# Patient Record
(demographics unavailable — no encounter records)

---

## 2024-10-25 NOTE — DISCUSSION/SUMMARY
[de-identified] : Impression advanced osteoarthritis right hip Recommendation I have explained to patient and accompanying sister still has acceptable quality of life and as such not withstanding radiographic appearance of advanced osteoarthritis of the right hip she still functioning at a high level if symptoms severity increases in quality life deteriorates beyond her ability to cope and/or adapt patient understands that she is a candidate for total hip replacement.  Follow-up 6 months or sooner

## 2024-10-25 NOTE — PHYSICAL EXAM
[de-identified] : Constitutional:Well nourished , well developed and in no acute distress Psychiatric: Alert and oriented to time place and person.Appropriate affect  Skin:Head, neck, arms and lower extremities:no lesions or discoloration HEENT: Normocephalic, EOM intact, Nasal septum midline, Respiratory: Unlabored respirations,no audible wheezing ,no tachypnea, no cyanosis Cardiovascular: no leg swelling  no ankle edema no JVD, pulse regular Vascular: no calf or thigh tenderness,  Peripheral pulses; intact Lymphatics:No groin adenopathy,no lymphedema lower  or upper extremities Right right hip mild Trendelenburg leg length equal mild restricted internal rotation provocation of mild hip pain neurovascular intact right leg [de-identified] :  x-rays AP pelvis right hip superolateral joint space narrowing bone-on-bone subchondral sclerosis marginal osteophytes

## 2024-10-25 NOTE — HISTORY OF PRESENT ILLNESS
[de-identified] : Patient seen in follow-up for right hip.  Patient reports that if she "overdoes it particular going up and down a lot of stairs experiences right groin pain which is relieved by application of heat overall patient reports that she is "not too much pain" occasionally experiences point discomfort in the buttock and is noted by sister to limp when she walks.  Overall quality of life is not restricted in day-to-day activity patient infrequently takes ibuprofen with good good relief

## 2025-03-10 NOTE — HISTORY OF PRESENT ILLNESS
[de-identified] : Patient seen in follow-up for right hip.Patient has been undergoing physical therapy with minimal symptom improvement pain now interferes with sleep does affect daily quality of life.

## 2025-03-10 NOTE — PHYSICAL EXAM
[de-identified] : Constitutional:Well nourished , well developed and in no acute distress Psychiatric: Alert and oriented to time place and person.Appropriate affect  Skin:Head, neck, arms and lower extremities:no lesions or discoloration HEENT: Normocephalic, EOM intact, Nasal septum midline, Respiratory: Unlabored respirations,no audible wheezing ,no tachypnea, no cyanosis Cardiovascular: no leg swelling  no ankle edema no JVD, pulse regular Vascular: no calf or thigh tenderness,  Peripheral pulses; intact Lymphatics:No groin adenopathy,no lymphedema lower  or upper extremities Right right hip mild Trendelenburg leg length equal mild restricted internal rotation provocation of mild hip pain neurovascular intact right leg [de-identified] :  x-rays AP pelvis right hip superolateral joint space narrowing bone-on-bone subchondral sclerosis marginal osteophytes

## 2025-03-10 NOTE — DISCUSSION/SUMMARY
[de-identified] : Impression advanced osteoarthritis right hip Recommendation I have explained to The patient and if and when she feels quality life is sufficiently compromised she is a candidate for total hip replacement I reviewed risk benefits and alternatives

## 2025-03-26 NOTE — DISCUSSION/SUMMARY
[de-identified] : Impression advanced osteoarthritis right hip. 3-4 mm abrasion junction middle and lateral thigh. Instructed on local care. Will evaluate pre-op.  75 year old with right hip osteoarthritis, with debilitating right hip pain that is unresponsive to comprehensive conservative management and compromising quality of life. The conditions and treatment options have been discussed with the patient. Given Xrays, failure of prior conservative treatment including physical therapy, NSAIDs, cortisone injections, the patient understands that I recommend total hip replacement as their best option for long term pain relief. At this time, the patient is a candidate for surgery based on xrays and quality of life. The patient participated in an interactive discussion of the THR implant planned for their surgery with questions answered, agreed with the treatment plan, and has decided to move forward with elective THR as planned. Implant brand choices and bearing surfaces were offered to the patient. The patient agreed to the plan of care as well as the use of implants for their total hip replacement.   Patient will be scheduled for right total hip replacement.   Patient requires radiation treatment of the hip joint prior to the surgery to prevent heterotopic ossification.   I discussed with patient that if and when patient feels quality of life is significant compromised and unresponsive to conservative management patient will be considered a candidate for total hip replacement.   Dr. Amado Guerin MD

## 2025-03-26 NOTE — PHYSICAL EXAM
[de-identified] : Constitutional:Well nourished , well developed and in no acute distress Psychiatric: Alert and oriented to time place and person.Appropriate affect  Skin:Head, neck, arms and lower extremities:no lesions or discoloration HEENT: Normocephalic, EOM intact, Nasal septum midline, Respiratory: Unlabored respirations,no audible wheezing ,no tachypnea, no cyanosis Cardiovascular: no leg swelling  no ankle edema no JVD, pulse regular Vascular: no calf or thigh tenderness,  Peripheral pulses; intact Lymphatics:No groin adenopathy,no lymphedema lower  or upper extremities Right hip mild Trendelenburg leg length equal mild restricted internal rotation provocation of mild hip pain neurovascular intact right leg 3-4 mm abrasion junction middle and lateral thigh. [de-identified] :  x-rays AP pelvis right hip superolateral joint space narrowing bone-on-bone subchondral sclerosis marginal osteophytes

## 2025-03-26 NOTE — ADDENDUM
[FreeTextEntry1] : I, Zafar Nichols, acted solely as a scribe for Dr. Amado Guerin MD on this date 03/26/2025.   All medical record entries made by the Scribe were at my, Dr. Amado Guerin MD, direction and personally dictated by me on 03/26/2025. I have reviewed the chart and agree that the record accurately reflects my personal performance of the history, physical exam, assessment and plan. I have also personally directed, reviewed, and agreed with the chart.

## 2025-03-26 NOTE — HISTORY OF PRESENT ILLNESS
[de-identified] : Patient seen in follow-up for right hip pain. She reports no change in her symptoms since last visit. She is scheduled for a THR. She presents today for a skin check. Of note, she also details right knee pain.

## 2025-04-16 NOTE — ADDENDUM
[FreeTextEntry1] : This note was written by Dayanara Weaver on 04/16/2025 acting solely as a scribe for Dr. Amado Guerin.    All medical record entries made by the Scribe were at my, Dr. Amado Guerin, direction and personally dictated by me on 04/16/2025. I have personally reviewed the chart and agree that the record accurately reflects my personal performance of the history, physical exam, assessment and plan.

## 2025-04-16 NOTE — HISTORY OF PRESENT ILLNESS
[de-identified] : s/p right total hip replacement 04/03/2025 [de-identified] : ASHLEIGH RALPH is a 75 year female presents for evaluation s/p right THR. Significant improvement in preoperative pain. Denies fever, chills, groin pain, thigh pain. The patient does describe some residual numbness at operative site. Slight soreness near incision site when ambulating. Ambulating with a cane at this time. [de-identified] : Constitutional: Well nourished, no distress. No neurovascular symptoms.   o Right Hip Exam: Incision healed, clean dry, and in tact. Sutures in tact. Satisfactory gait. Leg lengths equal.  ROM: satisfactory pain free. Ligaments intact. resisted hip flexion 5/5, resisted hip abduction 5/5. [de-identified] : X-rays of the AP pelvis and AP, lateral of the right hip obtained today 04/16/2025 demonstrates total hip replacement components in good alignment, femoral heads well centered in acetabulum. [de-identified] : s/p right total hip replacement 04/03/2025 [de-identified] : 75-year-old female presents for evaluation s/p right THR.  Dressing applied. Wound care instructions provided today. They have been instructed on exercises to avoid, including running, jumping, squatting, lunging. PT.  US Doppler Rx given  Follow up 1 month

## 2025-04-16 NOTE — HISTORY OF PRESENT ILLNESS
[de-identified] : s/p right total hip replacement 04/03/2025 [de-identified] : ASHLEIGH RALPH is a 75 year female presents for evaluation s/p right THR. Significant improvement in preoperative pain. Denies fever, chills, groin pain, thigh pain. The patient does describe some residual numbness at operative site. Slight soreness near incision site when ambulating. Ambulating with a cane at this time. [de-identified] : Constitutional: Well nourished, no distress. No neurovascular symptoms.   o Right Hip Exam: Incision healed, clean dry, and in tact. Sutures in tact. Satisfactory gait. Leg lengths equal.  ROM: satisfactory pain free. Ligaments intact. resisted hip flexion 5/5, resisted hip abduction 5/5. [de-identified] : X-rays of the AP pelvis and AP, lateral of the right hip obtained today 04/16/2025 demonstrates total hip replacement components in good alignment, femoral heads well centered in acetabulum. [de-identified] : s/p right total hip replacement 04/03/2025 [de-identified] : 75-year-old female presents for evaluation s/p right THR.  Dressing applied. Wound care instructions provided today. They have been instructed on exercises to avoid, including running, jumping, squatting, lunging. PT.  US Doppler Rx given  Follow up 1 month

## 2025-05-14 NOTE — HISTORY OF PRESENT ILLNESS
[de-identified] : s/p right total hip replacement 04/03/2025 [de-identified] : ASHLEIGH RALPH is a 75 year female presents for evaluation s/p right THR. Significant improvement in preoperative pain. Preoperative weakness improved but still present. Difficulty with stair climbing. Denies fever, chills, groin pain, thigh pain. She did notice that a stitch fell off. Of note, the patient details bilateral knee pain, She is currently being treated by Dr. Evelyn Navarrete. She has received cortisone and Monovisc injections with good relief. Ambulating independently at this time. [de-identified] : Constitutional: Well nourished, no distress. No neurovascular symptoms.   o Right Hip Exam: Incision healed, clean dry, and in tact. Sutures removed previously. Satisfactory gait. Leg lengths equal.  ROM: satisfactory pain free. Ligaments intact. resisted hip flexion 5/5, resisted hip abduction 5/5. [de-identified] : X-rays of the AP pelvis and AP, lateral of the right hip obtained today 05/14/2025 demonstrates total hip replacement components in good alignment, femoral heads well centered in acetabulum.  Doppler Ultrasound obtained 04/17/2025 were negative for right lower extremity DVT. [de-identified] : s/p right total hip replacement 04/03/2025 [de-identified] : 75-year-old female presents for evaluation s/p right THR.  They have been instructed on exercises to avoid, including running, jumping, squatting, lunging. Continue PT. Follow-up with Dr. Evelyn Navarrete for her right knee pain.  Follow up 1 month

## 2025-05-14 NOTE — ADDENDUM
[FreeTextEntry1] : I, Zafar Nichols, acted solely as a scribe for Dr. Amado Guerin MD on this date 05/14/2025.   All medical record entries made by the Scribe were at my, Dr. Amado Guerin MD, direction and personally dictated by me on 05/14/2025. I have reviewed the chart and agree that the record accurately reflects my personal performance of the history, physical exam, assessment and plan. I have also personally directed, reviewed, and agreed with the chart.

## 2025-06-18 NOTE — ADDENDUM
[FreeTextEntry1] : This note was written by Dayanara Weaver on 06/18/2025 acting solely as a scribe for Dr. Amado Guerin.    All medical record entries made by the Scribe were at my, Dr. Amado Guerin, direction and personally dictated by me on 06/18/2025. I have personally reviewed the chart and agree that the record accurately reflects my personal performance of the history, physical exam, assessment and plan.

## 2025-06-18 NOTE — HISTORY OF PRESENT ILLNESS
[de-identified] : s/p right total hip replacement 04/03/2025 [de-identified] : ASHLEIGH RALPH is a 75-year-old female presents for evaluation s/p right THR. Significant improvement in preoperative pain. She is attending PT 2x a week making improvements. Patient continues to report right leg weakness. Ambulating independently at this time but she brings her cane out just in case.  [de-identified] : Constitutional: Well nourished, no distress. No neurovascular symptoms.   o Right Hip Exam: Incision healed, clean dry, and in tact. Trendelenburg gait. Leg lengths equal.  ROM: satisfactory pain free. Ligaments intact. resisted hip flexion 5/5, resisted hip abduction 5/5. [de-identified] : s/p right total hip replacement 04/03/2025 [de-identified] : 75-year-old female presents for evaluation s/p right THR.  They have been instructed on exercises to avoid, including running, jumping, squatting, lunging. Continue PT.  Follow up 1 month

## 2025-07-16 NOTE — PHYSICAL EXAM
[de-identified] : Constitutional: Well nourished, no distress. No neurovascular symptoms.  o Right Hip Exam: Incision healed, clean dry, and in tact. ambulating independently. Leg lengths equal. ROM:  flexion 90 internal 20, external 30, 30 abduction 35 adduction pain free. Ligaments intact. resisted hip flexion 5/5, resisted hip abduction 5/5. Denies groin or thigh pain.     n/a

## 2025-07-16 NOTE — DISCUSSION/SUMMARY
[de-identified] : 75-year-old female presents for evaluation s/p right THR. Discussed continue PT/HEP for stretching.   Follow-up 2 months

## 2025-07-16 NOTE — REASON FOR VISIT
[Follow-Up Visit] : a follow-up visit for [FreeTextEntry2] : s/p right total hip replacement 04/03/2025.

## 2025-07-16 NOTE — HISTORY OF PRESENT ILLNESS
[de-identified] : ASHLEIGH RALPH is a 75-year-old female presents for evaluation of right THR. Significant improvement in preoperative pain. She is attending PT 2x a week making improvements. Patient continues to report right leg weakness. Ambulating independently at this time but she brings her cane out just in case. denies groin or thigh pain, has experienced incisional site discomfort when bending down.

## 2025-07-16 NOTE — ADDENDUM
[FreeTextEntry1] : This note was written by Dayanara Weaver on 07/16/2025 acting solely as a scribe for Dr. Amado Guerin.    All medical record entries made by the Scribe were at my, Dr. Amado Guerin, direction and personally dictated by me on 07/16/2025. I have personally reviewed the chart and agree that the record accurately reflects my personal performance of the history, physical exam, assessment and plan.